# Patient Record
Sex: FEMALE | Race: WHITE | NOT HISPANIC OR LATINO | URBAN - NONMETROPOLITAN AREA
[De-identification: names, ages, dates, MRNs, and addresses within clinical notes are randomized per-mention and may not be internally consistent; named-entity substitution may affect disease eponyms.]

---

## 2022-07-18 ENCOUNTER — OFFICE VISIT (OUTPATIENT)
Dept: URGENT CARE | Facility: PHYSICIAN GROUP | Age: 47
End: 2022-07-18

## 2022-07-18 VITALS
HEART RATE: 115 BPM | WEIGHT: 200 LBS | TEMPERATURE: 98.2 F | HEIGHT: 59 IN | RESPIRATION RATE: 14 BRPM | BODY MASS INDEX: 40.32 KG/M2 | DIASTOLIC BLOOD PRESSURE: 88 MMHG | OXYGEN SATURATION: 98 % | SYSTOLIC BLOOD PRESSURE: 130 MMHG

## 2022-07-18 DIAGNOSIS — R21 RASH AND NONSPECIFIC SKIN ERUPTION: ICD-10-CM

## 2022-07-18 DIAGNOSIS — L30.9 ECZEMA, UNSPECIFIED TYPE: ICD-10-CM

## 2022-07-18 PROCEDURE — 99203 OFFICE O/P NEW LOW 30 MIN: CPT | Performed by: NURSE PRACTITIONER

## 2022-07-18 RX ORDER — TRIAMCINOLONE ACETONIDE 1 MG/G
1 CREAM TOPICAL 2 TIMES DAILY
Qty: 45 G | Refills: 1 | Status: SHIPPED | OUTPATIENT
Start: 2022-07-18

## 2022-07-18 RX ORDER — PREDNISONE 20 MG/1
TABLET ORAL
Qty: 15 TABLET | Refills: 0 | Status: SHIPPED | OUTPATIENT
Start: 2022-07-18

## 2022-07-18 RX ORDER — CEPHALEXIN 500 MG/1
500 CAPSULE ORAL 4 TIMES DAILY
Qty: 20 CAPSULE | Refills: 0 | Status: SHIPPED | OUTPATIENT
Start: 2022-07-18 | End: 2022-07-23

## 2022-07-18 ASSESSMENT — ENCOUNTER SYMPTOMS: CONSTITUTIONAL NEGATIVE: 1

## 2022-07-18 ASSESSMENT — VISUAL ACUITY: OU: 1

## 2022-07-18 NOTE — PROGRESS NOTES
"Subjective:     Kelsi Bryant is a 47 y.o. female who presents for Edema (SWELLING IN BOTH HANDS X 1 WEEK )       Rash  This is a new problem. The problem has been gradually worsening since onset.     2 months ago, patient started to notice at both of her hands.  Symptoms worsening.  Reports swelling now.  Also reports small, itchy lesions on her upper arms, thighs, and belly.  Reports children have eczema.    Review of Systems   Constitutional: Negative.    Skin: Positive for itching and rash.   All other systems reviewed and are negative.    Refer to HPI for additional details.    During this visit, appropriate PPE was worn, hand hygiene was performed, and the patient and any visitors were masked.    PMH:  has no past medical history on file.    MEDS:   Current Outpatient Medications:   •  predniSONE (DELTASONE) 20 MG Tab, Take 3 tabs daily x 3 days, then 2 tabs daily x 2 days, then 1 tab x 2 days., Disp: 15 Tablet, Rfl: 0  •  triamcinolone acetonide (KENALOG) 0.1 % Cream, Apply 1 Application topically 2 times a day., Disp: 45 g, Rfl: 1  •  cephALEXin (KEFLEX) 500 MG Cap, Take 1 Capsule by mouth 4 times a day for 5 days., Disp: 20 Capsule, Rfl: 0    ALLERGIES: Not on File  SURGHX: History reviewed. No pertinent surgical history.  SOCHX:  reports that she has quit smoking. She has never used smokeless tobacco. She reports that she does not drink alcohol and does not use drugs.    FH: Per [facility] [place] as applicable/pertinent.      Objective:     /88   Pulse (!) 115   Temp 36.8 °C (98.2 °F) (Temporal)   Resp 14   Ht 1.499 m (4' 11\")   Wt 90.7 kg (200 lb)   SpO2 98%   BMI 40.40 kg/m²     Physical Exam  Nursing note reviewed.   Constitutional:       General: She is not in acute distress.     Appearance: She is well-developed. She is not ill-appearing or toxic-appearing.   Eyes:      General: Vision grossly intact.   Cardiovascular:      Rate and Rhythm: Tachycardia present.   Pulmonary:      Effort: Pulmonary " effort is normal. No respiratory distress.   Musculoskeletal:         General: No deformity. Normal range of motion.   Skin:     Coloration: Skin is not pale.      Findings: Erythema and rash present.      Comments: Erythematous, dry, scaly, inflamed patches on palms of hand and on dorsum, few small yellowish crusts; scattered erythematous, dry, papular lesions on upper arms, thighs, abdomen   Neurological:      Mental Status: She is alert and oriented to person, place, and time.      Motor: No weakness.   Psychiatric:         Behavior: Behavior normal. Behavior is cooperative.       Assessment/Plan:     1. Eczema, unspecified type  - predniSONE (DELTASONE) 20 MG Tab; Take 3 tabs daily x 3 days, then 2 tabs daily x 2 days, then 1 tab x 2 days.  Dispense: 15 Tablet; Refill: 0  - triamcinolone acetonide (KENALOG) 0.1 % Cream; Apply 1 Application topically 2 times a day.  Dispense: 45 g; Refill: 1    2. Rash and nonspecific skin eruption  - predniSONE (DELTASONE) 20 MG Tab; Take 3 tabs daily x 3 days, then 2 tabs daily x 2 days, then 1 tab x 2 days.  Dispense: 15 Tablet; Refill: 0  - triamcinolone acetonide (KENALOG) 0.1 % Cream; Apply 1 Application topically 2 times a day.  Dispense: 45 g; Refill: 1  - cephALEXin (KEFLEX) 500 MG Cap; Take 1 Capsule by mouth 4 times a day for 5 days.  Dispense: 20 Capsule; Refill: 0    Rx as above sent electronically.   Patient will follow-up with PCP early next month.    Differential diagnosis, natural history, supportive care, over-the-counter symptom management per 's instructions, close monitoring, and indications for immediate follow-up discussed.     All questions answered. Patient agrees with the plan of care.    Discharge summary provided via [employer].